# Patient Record
Sex: MALE | Race: BLACK OR AFRICAN AMERICAN | NOT HISPANIC OR LATINO
[De-identification: names, ages, dates, MRNs, and addresses within clinical notes are randomized per-mention and may not be internally consistent; named-entity substitution may affect disease eponyms.]

---
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
## 2024-11-25 NOTE — PROGRESS NOTE PEDS - SUBJECTIVE AND OBJECTIVE BOX
Interval/Overnight Events:    VITAL SIGNS:  T(C): 39.3 (21 @ 07:00), Max: 39.3 (21 @ 04:00)  HR: 67 (21 @ 07:23) (67 - 91)  BP: 113/40 (21 @ 21:00) (113/40 - 113/40)  ABP: 128/52 (21 @ 07:00) (118/39 - 164/57)  ABP(mean): 68 (21 @ 07:00) (56 - 82)  RR: 15 (21 @ 07:00) (15 - 18)  SpO2: 89% (21 @ 07:23) (87% - 94%)  CVP(mm Hg): 11 (21 @ 07:00) (-2 - 11)      Medications:  enoxaparin SubCutaneous Injection - Peds 40 milliGRAM(s) SubCutaneous daily  heparin   Infusion - Pediatric 0.027 Unit(s)/kG/Hr IV Continuous <Continuous>  cefepime  IV Intermittent - Peds 2000 milliGRAM(s) IV Intermittent every 12 hours  dexAMETHasone IV Intermittent - Pediatric 6 milliGRAM(s) IV Intermittent every 24 hours  lactated ringers. - Pediatric 1000 milliLiter(s) IV Continuous <Continuous>  pantoprazole  IV Intermittent - Peds 40 milliGRAM(s) IV Intermittent daily  sodium chloride 0.9% -  250 milliLiter(s) IV Continuous <Continuous>  sodium chloride 0.9%. - Pediatric 1000 milliLiter(s) IV Continuous <Continuous>  sodium chloride 0.9%. - Pediatric 1000 milliLiter(s) IV Continuous <Continuous>  chlorhexidine 0.12% Oral Liquid - Peds 15 milliLiter(s) Oral Mucosa every 12 hours  chlorhexidine 2% Topical Cloths - Peds 1 Application(s) Topical daily  petrolatum, white/mineral oil Ophthalmic Ointment - Peds 1 Application(s) Both EYES every 12 hours PRN  polyvinyl alcohol 1.4%/povidone 0.6% Ophthalmic Solution - Peds 1 Drop(s) Both EYES every 2 hours PRN    ===========================RESPIRATORY==========================  [x ] Mechanical Ventilation: Mode: SIMV with PS, RR (machine): 15, TV (machine): 500, FiO2: 65, PEEP: 16, PS: 10, ITime: 1.2, MAP: 22, PIP: 32    ALBUTerol  90 MICROgram(s) HFA Inhaler - Peds 4 Puff(s) Inhalation every 6 hours    Secretions:  =========================CARDIOVASCULAR========================  Cardiac Rhythm:	[x] NSR		[ ] Other:    furosemide Infusion - Peds 0.064 mG/kG/Hr IV Continuous <Continuous>  norepinephrine Infusion - Peds 0.01 MICROgram(s)/kG/Min IV Continuous <Continuous>    [ ] PIV  [ ] Central Venous Line	[ ] R	[ ] L	[ ] IJ	[ ] Fem	[ ] SC			Placed:   [ ] Arterial Line		[ ] R	[ ] L	[ ] PT	[ ] DP	[ ] Fem	[ ] Rad	[ ] Ax	Placed:   [ ] PICC:				[ ] Broviac		[ ] Mediport    ======================HEMATOLOGY/ONCOLOGY====================  Transfusions:	[ ] PRBC	[ ] Platelets	[ ] FFP		[ ] Cryoprecipitate  DVT Prophylaxis: Turning & Positioning per protocol    ===================FLUIDS/ELECTROLYTES/NUTRITION=================  I&O's Summary    25 Dec 2021 07:01  -  26 Dec 2021 07:00  --------------------------------------------------------  IN: 3073.8 mL / OUT: 4480 mL / NET: -1406.2 mL      Diet:	[ ] Regular	[ ] Soft		[ ] Clears	[ ] NPO  .	[ ] Other:  .	[ ] NGT		[ ] NDT		[ ] GT		[ ] GJT    ============================NEUROLOGY=========================    acetaminophen   IV Intermittent - Peds. 1000 milliGRAM(s) IV Intermittent every 6 hours PRN  cisatracurium  IV Push - Peds 19.8 milliGRAM(s) IV Push every 1 hour PRN  cisatracurium Infusion - Peds 3 MICROgram(s)/kG/Min IV Continuous <Continuous>  dexMEDEtomidine Infusion - Peds 1.5 MICROgram(s)/kG/Hr IV Continuous <Continuous>  morphine Infusion - Peds 0.14 mG/kG/Hr IV Continuous <Continuous>    [x] Adequacy of sedation and pain control has been assessed and adjusted    ===========================PATIENT CARE========================  [ ] Cooling [place] being used. Target Temperature:  [ ] There are pressure ulcers/areas of breakdown that are being addressed?  [x] Preventative measures are being taken to decrease risk for skin breakdown.  [x] Necessity of urinary, arterial, and venous catheters discussed    =========================ANCILLARY TESTS========================  LABS:  ABG - ( 26 Dec 2021 05:37 )  pH: 7.35  /  pCO2: 62    /  pO2: 72    / HCO3: 34    / Base Excess: 6.9   /  SaO2: 95.4  / Lactate: x                                                11.8                  Neurophils% (auto):   76.3   ( @ 00:49):    22.42)-----------(117          Lymphocytes% (auto):  6.1                                           35.2                   Eosinphils% (auto):   0.0      Manual%: Neutrophils x    ; Lymphocytes x    ; Eosinophils x    ; Bands%: x    ; Blasts x                                  x      |  x      |  x                   Calcium: x     / iCa: 1.05   ( @ 00:51)    ----------------------------<  x         Magnesium: x                                x       |  x      |  x                Phosphorous: x        TPro  5.7    /  Alb  3.2    /  TBili  1.5    /  DBili  x      /  AST  47     /  ALT  28     /  AlkPhos  64     26 Dec 2021 00:49  (  @ 20:59 )   PT: 14.4 sec;   INR: 1.27 ratio  aPTT: 43.1 sec  RECENT CULTURES:   @ 16:35 .Blood Blood     No growth to date.       @ 15:21 .Sputum Sputum     Normal Respiratory Soumya present    Moderate polymorphonuclear leukocytes per low power field  Few Squamous epithelial cells per low power field  Few Yeast like cells per oil power field  Few Gram positive cocci in pairs per oil power field     @ 12:52 Catheterized Catheterized     No growth       @ 09:50 .Blood Blood-Peripheral     Growth in anaerobic bottle: Facklamia hominis "Susceptibilities not  performed"  **BCID performed. No targets detected.**  ***Blood Panel PCR results on this specimen are available  approximately 3 hours after the Gram stain result.***  Gram stain,PCR, and/or culture results may not always  correspond due to difference in methodologies.  ************************************************************  This PCR assay was performed by multiplex PCR. This  Assay tests for 66 bacterial and resistancegene targets.  Please refer to the [facility] test directory  at [email or web address] for details.    Growth in anaerobic bottle: Gram positive cocci in pairs        IMAGING STUDIES:    ==========================PHYSICAL EXAM========================  GENERAL: In no acute distress  RESPIRATORY: Lungs clear to auscultation bilaterally. Good aeration. No rales, rhonchi, retractions or wheezing. Effort even and unlabored.  CARDIOVASCULAR: Regular rate and rhythm. Normal S1/S2. No murmurs, rubs, or gallop.   ABDOMEN: Soft, non-distended.    SKIN: No rash.  EXTREMITIES: Warm and well perfused. No gross extremity deformities.  NEUROLOGIC: Awake and alert  ==============================================================  Parent/Guardian is at the bedside:	[ ] Yes	[ ] No  Patient and Parent/Guardian updated as to the progress/plan of care:	[x ] Yes	[ ] No    [x ] The patient remains in critical and unstable condition, and requires ICU care and monitoring; The total critical care time spent by attending physician was      minutes, excluding procedure time.  [ ] The patient is improving but requires continued monitoring and adjustment of therapy   Interval/Overnight Events: Lasix drip adjusted overnight. Vent adjusted as needed.    VITAL SIGNS:  T(C): 39.3 (21 @ 07:00), Max: 39.3 (21 @ 04:00)  HR: 67 (21 @ 07:23) (67 - 91)  BP: 113/40 (21 @ 21:00) (113/40 - 113/40)  ABP: 128/52 (21 @ 07:00) (118/39 - 164/57)  ABP(mean): 68 (21 @ 07:00) (56 - 82)  RR: 15 (21 @ 07:00) (15 - 18)  SpO2: 89% (21 @ 07:23) (87% - 94%)  CVP(mm Hg): 11 (21 @ 07:00) (-2 - 11)  End tidal 35-40    Medications:  enoxaparin SubCutaneous Injection - Peds 40 milliGRAM(s) SubCutaneous daily  heparin   Infusion - Pediatric 0.027 Unit(s)/kG/Hr IV Continuous <Continuous>  cefepime  IV Intermittent - Peds 2000 milliGRAM(s) IV Intermittent every 12 hours  dexAMETHasone IV Intermittent - Pediatric 6 milliGRAM(s) IV Intermittent every 24 hours  lactated ringers. - Pediatric 1000 milliLiter(s) IV Continuous <Continuous>  pantoprazole  IV Intermittent - Peds 40 milliGRAM(s) IV Intermittent daily  sodium chloride 0.9% -  250 milliLiter(s) IV Continuous <Continuous>  sodium chloride 0.9%. - Pediatric 1000 milliLiter(s) IV Continuous <Continuous>  sodium chloride 0.9%. - Pediatric 1000 milliLiter(s) IV Continuous <Continuous>  chlorhexidine 0.12% Oral Liquid - Peds 15 milliLiter(s) Oral Mucosa every 12 hours  chlorhexidine 2% Topical Cloths - Peds 1 Application(s) Topical daily  petrolatum, white/mineral oil Ophthalmic Ointment - Peds 1 Application(s) Both EYES every 12 hours PRN  polyvinyl alcohol 1.4%/povidone 0.6% Ophthalmic Solution - Peds 1 Drop(s) Both EYES every 2 hours PRN    ===========================RESPIRATORY==========================  [x ] Mechanical Ventilation: Mode: SIMV with PS, RR (machine): 15, TV (machine): 500, FiO2: 80, PEEP: 16, PS: 10, ITime: 1.2, MAP: 22, PIP: 32  NO at 20 ppm  ALBUTerol  90 MICROgram(s) HFA Inhaler - Peds 4 Puff(s) Inhalation every 6 hours    Secretions: minimal  =========================CARDIOVASCULAR========================  Cardiac Rhythm:	[x] NSR		[ ] Other:    furosemide Infusion - Peds 0.064 mG/kG/Hr IV Continuous <Continuous>  norepinephrine Infusion - Peds 0.01 MICROgram(s)/kG/Min IV Continuous <Continuous>    [ ] PIV  [x ] Central Venous Line	[ ] R	[x ] L	[ ] IJ	[x ] Fem	[ ] SC			Placed:   [x Arterial Line		[ ] R	[x ] L	[ ] PT	[x ] DP	[ ] Fem	[ ] Rad	[ ] Ax	Placed:   [ ] PICC:				[ ] Broviac		[ ] Mediport  [x] Bowers  ======================HEMATOLOGY/ONCOLOGY====================  Transfusions:	[ ] PRBC	[ ] Platelets	[ ] FFP		[ ] Cryoprecipitate  DVT Prophylaxis: Turning & Positioning per protocol; Lovenox    ===================FLUIDS/ELECTROLYTES/NUTRITION=================  I&O's Summary    25 Dec 2021 07:01  -  26 Dec 2021 07:00  --------------------------------------------------------  IN: 3073.8 mL / OUT: 4480 mL / NET: -1406.2 mL  Repogle 250 ml bilious fluid    Diet:	[ ] Regular	[ ] Soft		[ ] Clears	[x] NPO  .	[ ] Other:  .	[ ] NGT		[ ] NDT		[ ] GT		[ ] GJT    ============================NEUROLOGY=========================    acetaminophen   IV Intermittent - Peds. 1000 milliGRAM(s) IV Intermittent every 6 hours PRN  cisatracurium  IV Push - Peds 19.8 milliGRAM(s) IV Push every 1 hour PRN  cisatracurium Infusion - Peds 3 MICROgram(s)/kG/Min IV Continuous <Continuous>  dexMEDEtomidine Infusion - Peds 1.5 MICROgram(s)/kG/Hr IV Continuous <Continuous>  morphine Infusion - Peds 0.14 mG/kG/Hr IV Continuous <Continuous>    [x] Adequacy of sedation and pain control has been assessed and adjusted    ===========================PATIENT CARE========================  [ ] Cooling [place] being used. Target Temperature:  [ ] There are pressure ulcers/areas of breakdown that are being addressed?  [x] Preventative measures are being taken to decrease risk for skin breakdown.  [x] Necessity of urinary, arterial, and venous catheters discussed    =========================ANCILLARY TESTS========================  LABS:  ABG - ( 26 Dec 2021 05:37 )  pH: 7.35  /  pCO2: 62    /  pO2: 72    / HCO3: 34    / Base Excess: 6.9   /  SaO2: 95.4  / Lactate: x                                                11.8                  Neurophils% (auto):   76.3   ( @ 00:49):    22.42)-----------(117          Lymphocytes% (auto):  6.1                                           35.2                   Eosinphils% (auto):   0.0      Manual%: Neutrophils x    ; Lymphocytes x    ; Eosinophils x    ; Bands%: x    ; Blasts x          144  |  103  |  38<H>  ----------------------------<  184<H>  4.9   |  29  |  1.77<H>    Ca    8.0<L>      26 Dec 2021 00:49  Phos  4.1       Mg     2.20           TPro  5.7    /  Alb  3.2    /  TBili  1.5    /  DBili  x      /  AST  47     /  ALT  28     /  AlkPhos  64     26 Dec 2021 00:49  (  @ 20:59 )   PT: 14.4 sec;   INR: 1.27 ratio  aPTT: 43.1 sec  RECENT CULTURES:   @ 16:35 .Blood Blood     No growth to date.       @ 15:21 .Sputum Sputum     Normal Respiratory Soumya present    Moderate polymorphonuclear leukocytes per low power field  Few Squamous epithelial cells per low power field  Few Yeast like cells per oil power field  Few Gram positive cocci in pairs per oil power field     @ 12:52 Catheterized Catheterized     No growth       @ 09:50 .Blood Blood-Peripheral     Growth in anaerobic bottle: Facklamia hominis "Susceptibilities not  performed"  **BCID performed. No targets detected.**  ***Blood Panel PCR results on this specimen are available  approximately 3 hours after the Gram stain result.***  Gram stain,PCR, and/or culture results may not always  correspond due to difference in methodologies.  ************************************************************  This PCR assay was performed by multiplex PCR. This  Assay tests for 66 bacterial and resistancegene targets.  Please refer to the [facility] test directory  at [email or web address] for details.    Growth in anaerobic bottle: Gram positive cocci in pairs    IMAGING STUDIES:    ==========================PHYSICAL EXAM========================  GENERAL: Orally intubated, sedated and paralyzed. Morbidly obese  RESPIRATORY: Exam limited by body habitus but lungs seem clear with equal breath sounds on both sides.  CARDIOVASCULAR: Regular rate and rhythm. Difficult exam due to body habitus  ABDOMEN: Soft, obese  SKIN: No rash.  EXTREMITIES: Warm and well perfused.   NEUROLOGIC: Sedated and paralyzed. Pupils small and equal, difficult to see reaction  ==============================================================  Parent/Guardian is at the bedside:	[x ] Yes	[ ] No  Patient and Parent/Guardian updated as to the progress/plan of care:	[x ] Yes	[ ] No    [x ] The patient remains in critical and unstable condition, and requires ICU care and monitoring; The total critical care time spent by attending physician was  50    minutes, excluding procedure time.  [ ] The patient is improving but requires continued monitoring and adjustment of therapy   3 = A little assistance

## 2025-02-01 NOTE — PROCEDURE NOTE - NSPOSTCAREGUIDE_GEN_A_CORE
Care for catheter as per unit/ICU protocols
Care for catheter as per unit/ICU protocols
Verbal/written post procedure instructions were given to patient/caregiver
Care for catheter as per unit/ICU protocols
[id number]

## (undated) DEVICE — SOL IRR POUR NS 0.9% 500ML

## (undated) DEVICE — ADAPTER FIBEROPTIC BRONCHOSCOPE DUAL AXIS SWIVEL

## (undated) DEVICE — TUBING SUCTION NONCONDUCTIVE 6MM X 12FT

## (undated) DEVICE — DRSG CURITY GAUZE SPONGE 4 X 4" 12-PLY

## (undated) DEVICE — LUBRICATING JELLY ONESHOT 1.25OZ

## (undated) DEVICE — WARMING BLANKET FULL ADULT

## (undated) DEVICE — DURABLE MEDICAL EQUIPMENT: Type: DURABLE MEDICAL EQUIPMENT

## (undated) DEVICE — TRAP SPECIMEN SPUTUM 40CC

## (undated) DEVICE — DRAPE THYROID 77" X 123"

## (undated) DEVICE — DRAPE MAGNETIC INSTRUMENT MEDIUM

## (undated) DEVICE — DRAPE 3/4 SHEET 52X76"

## (undated) DEVICE — PROTECTOR HEEL / ELBOW FLUFFY

## (undated) DEVICE — CONTAINER FORMALIN 10% 20ML

## (undated) DEVICE — VENODYNE/SCD SLEEVE CALF MEDIUM

## (undated) DEVICE — SPONGE PEANUT AUTO COUNT

## (undated) DEVICE — ELCTR GROUNDING PAD ADULT COVIDIEN

## (undated) DEVICE — VALVE BIOPSY BRONCHOVIDEOSCOPE

## (undated) DEVICE — SYR SLIP 10CC

## (undated) DEVICE — PREP CHLORAPREP HI-LITE ORANGE 26ML

## (undated) DEVICE — ADAPTER ULTRASET FLEX 15MM

## (undated) DEVICE — SYR LUER SLIP TIP 30CC

## (undated) DEVICE — DRSG TELFA 3 X 8

## (undated) DEVICE — SOL IRR POUR H2O 500ML

## (undated) DEVICE — WARMING BLANKET LOWER ADULT

## (undated) DEVICE — DRAPE TOWEL BLUE STICKY

## (undated) DEVICE — POSITIONER STRAP ARMBOARD VELCRO TS-30

## (undated) DEVICE — SUT SILK 2-0 18" FS

## (undated) DEVICE — ELCTR BOVIE TIP BLADE INSULATED 2.75" EDGE

## (undated) DEVICE — BAG URINE W METER 2L

## (undated) DEVICE — PACK MINOR WITH LAP

## (undated) DEVICE — BITE BLOCK PED LATEX FREE

## (undated) DEVICE — VALVE SUCTION EVIS 160/200/240